# Patient Record
(demographics unavailable — no encounter records)

---

## 2024-11-11 NOTE — PHYSICAL EXAM

## 2024-11-11 NOTE — HISTORY OF PRESENT ILLNESS
[FreeTextEntry1] : 30-year-old male with no significant past medical history comes in for evaluation of hyperlipidemia.  Patient reports recent lipid panel with an LDL of 160, has been trying to get it down naturally without success.  It was recommended by his primary care physician to start statin however patient is reluctant.  Overall he feels well denies any chest pain shortness of breath PND orthopnea.

## 2024-11-11 NOTE — DISCUSSION/SUMMARY
[FreeTextEntry1] : 1.  Hyperlipidemia: Advised a copy of recent blood test results for review.  Will refer for a coronary calcium score and advise once results are known.  Also suggest a repeat fasting lipid panel along with a lipoprotein a. 2.  Cardiac murmur: echo [EKG obtained to assist in diagnosis and management of assessed problem(s)] : EKG obtained to assist in diagnosis and management of assessed problem(s)